# Patient Record
Sex: FEMALE | Race: WHITE | NOT HISPANIC OR LATINO | Employment: FULL TIME | URBAN - NONMETROPOLITAN AREA
[De-identification: names, ages, dates, MRNs, and addresses within clinical notes are randomized per-mention and may not be internally consistent; named-entity substitution may affect disease eponyms.]

---

## 2018-11-14 ENCOUNTER — APPOINTMENT (OUTPATIENT)
Dept: OCCUPATIONAL MEDICINE | Facility: OTHER | Age: 38
End: 2018-11-14

## 2018-11-27 ENCOUNTER — APPOINTMENT (OUTPATIENT)
Dept: OCCUPATIONAL MEDICINE | Facility: OTHER | Age: 38
End: 2018-11-27

## 2019-08-15 ENCOUNTER — VIRTUAL VISIT (OUTPATIENT)
Dept: FAMILY MEDICINE | Facility: OTHER | Age: 39
End: 2019-08-15

## 2019-08-15 NOTE — PROGRESS NOTES
"Date:   Clinician: Micah John  Clinician NPI: [id number]  Patient: Mono Melgar  Patient : 1980  Patient Address: [facility] [place] [address]  Patient Phone: [phone]  Visit Protocol: URI  Patient Summary:  Mono is a 38 year old ( : 1980 ) female who initiated a Visit for cold, sinus infection, or influenza. When asked the question \"Please sign me up to receive news, health information and promotions from [employer].\", Mono responded \"No\".    Mono states her symptoms started gradually 7-9 days ago. After her symptoms started, they improved and then got worse again.   Her symptoms consist of enlarged lymph nodes, a headache, a sore throat, malaise, and a cough.   Symptom details     Cough: Mono coughs a few times an hour and her cough is not more bothersome at night. Phlegm does not come into her throat when she coughs.     Sore throat: Mono reports having severe throat pain (7-9 on a 10 point pain scale), has exudate on her tonsils, and can swallow liquids. The lymph nodes in her neck are enlarged. A rash has not appeared on the skin since the sore throat started.     Headache: She states the headache is mild (1-3 on a 10 point pain scale).      Mono denies having facial pain or pressure, myalgias, chills, nasal congestion, ear pain, fever, rhinitis, wheezing, and teeth pain. She also denies having a sinus infection within the past year, taking antibiotic medication for the symptoms, and having recent facial or sinus surgery in the past 60 days. She is not experiencing dyspnea.   Precipitating events  Mono is not sure if she has been exposed to someone with strep throat. She has not recently been exposed to someone with influenza. Mono has not been in close contact with any high risk individuals.   Pertinent medical history  Mono does not get yeast infections when she takes antibiotics.   Weight: 185 lbs   Mono does not smoke or " use smokeless tobacco.   She denies pregnancy and denies breastfeeding. She has menstruated in the past month.   Additional information as reported by the patient (free text): I just need a zip ticket for a step test per the call back     MEDICATIONS: Excedrin Extra Strength oral, ibuprofen oral, ALLERGIES: NKDA  Clinician Response:  Dear Mono,  I am sorry you are not feeling well. Your health is our priority. Based on the information provided, a throat swab is needed to determine whether or not you have strep throat. Please be seen in a clinic or urgent care in the next 1-2 days for a rapid strep test.  You will not be charged for this Visit. Thank you for trusting us with your care.   Diagnosis: Refer for additional evaluation - strep pharyngitis  Diagnosis ICD: R69

## 2019-08-15 NOTE — PROGRESS NOTES
"Date:   Clinician: Micah John  Clinician NPI: [id number]  Patient: Mono Melgar  Patient : 1980  Patient Address: [facility] [place] [address]  Patient Phone: [phone]  Visit Protocol: URI  Patient Summary:  Mono is a 38 year old ( : 1980 ) female who initiated a Visit for cold, sinus infection, or influenza. When asked the question \"Please sign me up to receive news, health information and promotions from [employer].\", Mono responded \"No\".    Mono states her symptoms started gradually 7-9 days ago. After her symptoms started, they improved and then got worse again.   Her symptoms consist of myalgia, enlarged lymph nodes, a headache, a sore throat, malaise, and a cough.   Symptom details     Cough: Mono coughs a few times an hour and her cough is not more bothersome at night. Phlegm comes into her throat when she coughs. She does not believe the phlegm causes the cough. The color of the phlegm is yellow.     Sore throat: Mono reports having severe throat pain (7-9 on a 10 point pain scale), has exudate on her tonsils, and can swallow liquids. The lymph nodes in her neck are enlarged. A rash has not appeared on the skin since the sore throat started.     Headache: She states the headache is mild (1-3 on a 10 point pain scale).      Mono denies having facial pain or pressure, chills, nasal congestion, ear pain, fever, rhinitis, wheezing, and teeth pain. She also denies having a sinus infection within the past year, taking antibiotic medication for the symptoms, and having recent facial or sinus surgery in the past 60 days. She is not experiencing dyspnea.   Precipitating events  Mono is not sure if she has been exposed to someone with strep throat. She has not recently been exposed to someone with influenza. Mono has not been in close contact with any high risk individuals.   Pertinent medical history  Mono does not get yeast infections " when she takes antibiotics.   Weight: 185 lbs   Mono does not smoke or use smokeless tobacco.   She denies pregnancy and denies breastfeeding. She has menstruated in the past month.     MEDICATIONS: Excedrin Extra Strength oral, ibuprofen oral, ALLERGIES: NKDA  Clinician Response:  Dear Mono,  Based on the information provided, you have a viral upper respiratory infection, otherwise known as a cold. Symptoms vary from person to person, but can include sneezing, coughing, a runny nose, sore throat, and headache and range from mild to severe.  Unfortunately, there are no medications that can cure a cold, so treatment is focused on controlling symptoms as much as possible. Most people gradually feel better until symptoms are gone in 1-2 weeks.  Based on the information provided, you have viral pharyngitis. This is a sore throat caused by a virus and is usually the first sign of a cold. Your sore throat should resolve in a couple days as other cold symptoms develop.  Unfortunately, there are no medications that can cure a cold, so treatment is focused on controlling symptoms as much as possible until you recover. Most people gradually feel better in 1-2 weeks.  Medication information  Because you have a viral infection, antibiotics will not help you get better. Treating a viral infection with antibiotics could actually make you feel worse.  I am prescribing:     Benzonatate (Tessalon Perles) 100 mg oral capsule. Take 1-2 capsules by mouth 3 times per day as needed for your cough. There are no refills with this prescription.   Unless you are allergic to the over-the-counter medication(s) below, I recommend using:       Acetaminophen (Tylenol or store brand) oral tablet. Take 1-2 tablets by mouth every 4-6 hours to help with the discomfort.      Dextromethorphan (Robitussin DM or store brand). This medication is an expectorant that works by thinning the mucus in your air passages to make it easier to cough up the  mucus and clear your airways. Please follow the instructions on the package.     Over-the-counter medications do not require a prescription. Ask the pharmacist if you have any questions.  Self care  The following tips will keep you as comfortable as possible while you recover:     Rest    Drink plenty of water and other liquids    Take a hot shower to loosen congestion    Use throat lozenges    Gargle with warm salt water (1/4 teaspoon of salt per 8 ounce glass of water)    Suck on frozen items such as popsicles or ice cubes    Drink hot tea with lemon and honey    Take a spoonful of honey to reduce your cough     When to seek care  Please be seen in a clinic or urgent care if new symptoms develop, or symptoms become worse.  Call 911 or go to the emergency room if you feel that your throat is closing off, you suddenly develop a rash, you are unable to swallow fluids, you are drooling, or you are having difficulty breathing.   Diagnosis: Viral URI  Diagnosis ICD: J06.9  Additional Clinician Notes: If sore throat is causing difficulty opening mouth or swallowing or if there is no improvement in next few days come in to be seen in urgent care  Prescription: benzonatate (Tessalon Perles) 100 mg oral capsule 30 capsule, 5 days supply. Take 1-2 capsules by mouth 3 times per day as needed. Refills: 0, Refill as needed: no, Allow substitutions: yes  Addendum created: August 15 13:28:04, 2019 created by: Micah rogers: Please inform her of the situation. Thanks.  Addendum created: August 15 13:09:29, 2019 created by: Micah rogers: Ok to place order for strep swab culture

## 2019-08-23 ENCOUNTER — HOSPITAL ENCOUNTER (EMERGENCY)
Facility: HOSPITAL | Age: 39
Discharge: HOME | End: 2019-08-23
Attending: NURSE PRACTITIONER | Admitting: NURSE PRACTITIONER
Payer: COMMERCIAL

## 2019-08-23 VITALS
WEIGHT: 185 LBS | SYSTOLIC BLOOD PRESSURE: 140 MMHG | HEART RATE: 71 BPM | RESPIRATION RATE: 18 BRPM | DIASTOLIC BLOOD PRESSURE: 82 MMHG | BODY MASS INDEX: 29.03 KG/M2 | HEIGHT: 67 IN | TEMPERATURE: 98.2 F | OXYGEN SATURATION: 99 %

## 2019-08-23 DIAGNOSIS — J02.9 PHARYNGITIS: Primary | ICD-10-CM

## 2019-08-23 LAB
DEPRECATED S PYO AG THROAT QL EIA: NORMAL
SPECIMEN SOURCE: NORMAL

## 2019-08-23 PROCEDURE — G0463 HOSPITAL OUTPT CLINIC VISIT: HCPCS

## 2019-08-23 PROCEDURE — 87081 CULTURE SCREEN ONLY: CPT | Performed by: FAMILY MEDICINE

## 2019-08-23 PROCEDURE — 99203 OFFICE O/P NEW LOW 30 MIN: CPT | Mod: Z6 | Performed by: NURSE PRACTITIONER

## 2019-08-23 PROCEDURE — 87880 STREP A ASSAY W/OPTIC: CPT | Performed by: FAMILY MEDICINE

## 2019-08-23 RX ORDER — AMOXICILLIN 500 MG/1
500 CAPSULE ORAL 3 TIMES DAILY
Qty: 21 CAPSULE | Refills: 0 | Status: SHIPPED | OUTPATIENT
Start: 2019-08-23 | End: 2019-08-30

## 2019-08-23 ASSESSMENT — ENCOUNTER SYMPTOMS
SORE THROAT: 1
CHILLS: 0
ARTHRALGIAS: 0
SINUS PAIN: 0
COUGH: 0
APPETITE CHANGE: 0
HEADACHES: 1
FEVER: 0
SHORTNESS OF BREATH: 0
RHINORRHEA: 0
MYALGIAS: 0
VOICE CHANGE: 0
SINUS PRESSURE: 0

## 2019-08-23 ASSESSMENT — MIFFLIN-ST. JEOR: SCORE: 1548.84

## 2019-08-23 NOTE — ED AVS SNAPSHOT
[facility]  [address] [address]  Phone:  [phone]                                    Mono Melgar   MRN: [id number]    Department:  [facility]   Date of Visit:  8/23/2019           After Visit Summary Signature Page    I have received my discharge instructions, and my questions have been answered. I have discussed any challenges I see with this plan with the nurse or doctor.    ..........................................................................................................................................  Patient/Patient Representative Signature      ..........................................................................................................................................  Patient Representative Print Name and Relationship to Patient    ..................................................               ................................................  Date                                   Time    ..........................................................................................................................................  Reviewed by Signature/Title    ...................................................              ..............................................  Date                                               Time          22EPIC Rev 08/18

## 2019-08-23 NOTE — DISCHARGE INSTRUCTIONS
Take antibiotic as prescribed.  Drink water to keep hydrated and moisten throat.  Try throat lozenges or cough drops.  Return to emergency department for worsening or concerning symptoms.

## 2019-08-23 NOTE — ED PROVIDER NOTES
"  History     Chief Complaint   Patient presents with     Pharyngitis     2 wk hx of sore throat     HPI  Mono Melgar is a 38 year old female who presents to  for sore throat. Onset 2 weeks ago. She states she has had headaches, swollen tonsils, sore throat with exudates and low grade fever. Denies runny nose, cough or postnasal drip. Denies SOB or CP. She has tried apple cider and excedrin for headaches.     Allergies:  No Known Allergies    Problem List:    There are no active problems to display for this patient.       Past Medical History:    History reviewed. No pertinent past medical history.    Past Surgical History:    History reviewed. No pertinent surgical history.    Family History:    No family history on file.    Social History:  Marital Status:  Single [1]  Social History     Tobacco Use     Smoking status: None   Substance Use Topics     Alcohol use: None     Drug use: None        Medications:      amoxicillin (AMOXIL) 500 MG capsule         Review of Systems   Constitutional: Negative for appetite change, chills and fever.   HENT: Positive for sore throat. Negative for ear discharge, ear pain, postnasal drip, rhinorrhea, sinus pressure, sinus pain and voice change.    Respiratory: Negative for cough and shortness of breath.    Cardiovascular: Negative for chest pain.   Musculoskeletal: Negative for arthralgias and myalgias.   Neurological: Positive for headaches.   All other systems reviewed and are negative.      Physical Exam   BP: 140/82  Pulse: 71  Temp: 98.2  F (36.8  C)  Resp: 18  Height: 168.9 cm (5' 6.5\")  Weight: 83.9 kg (185 lb)(stated)  SpO2: 99 %      Physical Exam   Constitutional: She is oriented to person, place, and time. She appears well-developed and well-nourished.   HENT:   Right Ear: Tympanic membrane normal. No tenderness. No middle ear effusion.   Left Ear: Tympanic membrane normal. No tenderness.  No middle ear effusion.   Mouth/Throat: Uvula is midline. No tonsillar " abscesses. Tonsillar exudate.   Neck: Normal range of motion.   Cardiovascular: Normal rate, regular rhythm and normal heart sounds.   Pulmonary/Chest: Effort normal and breath sounds normal. No respiratory distress. She has no wheezes.   Neurological: She is alert and oriented to person, place, and time.   Skin: Skin is warm. Capillary refill takes less than 2 seconds.   Psychiatric: She has a normal mood and affect.   Nursing note and vitals reviewed.      ED Course        Procedures               Results for orders placed or performed during the hospital encounter of 08/23/19 (from the past 24 hour(s))   Rapid strep screen   Result Value Ref Range    Specimen Description Throat     Rapid Strep A Screen       NEGATIVE: No Group A streptococcal antigen detected by immunoassay, await culture report.       Medications - No data to display    Assessments & Plan (with Medical Decision Making)   1) Pharyngitis    Discussed findings with patient. Rapid strep negative, culture pending. Patient has had sore throat x 2 weeks. Erythema and left tonsillar exudates present. Patient is afebrile. Discussed viral vs bacterial etiology. Given that symptoms have persisted x 2 weeks, will treat with antibiotic. Advised patient to try throat lozenges and drink water to keep throat moistened. If no improvement in symptoms she will follow up with her PCP. Return to emergency department for worsening or concerning symptoms. Patient verbalized understanding.     I have reviewed the nursing notes.    I have reviewed the findings, diagnosis, plan and need for follow up with the patient.    Discharge Medication List as of 8/23/2019  5:44 PM      START taking these medications    Details   amoxicillin (AMOXIL) 500 MG capsule Take 1 capsule (500 mg) by mouth 3 times daily for 7 days, Disp-21 capsule, R-0, E-Prescribe             Final diagnoses:   Pharyngitis       8/23/2019   [facility]     Mpofu, Prudence, CNP  08/23/19  [id number]

## 2019-08-25 LAB
BACTERIA SPEC CULT: NORMAL
SPECIMEN SOURCE: NORMAL

## 2019-11-19 ENCOUNTER — APPOINTMENT (OUTPATIENT)
Dept: OCCUPATIONAL MEDICINE | Facility: OTHER | Age: 39
End: 2019-11-19

## 2020-04-04 ENCOUNTER — VIRTUAL VISIT (OUTPATIENT)
Dept: FAMILY MEDICINE | Facility: OTHER | Age: 40
End: 2020-04-04

## 2020-04-04 DIAGNOSIS — R21 RASH: Primary | ICD-10-CM

## 2020-04-04 RX ORDER — MUPIROCIN 20 MG/G
OINTMENT TOPICAL 3 TIMES DAILY
Qty: 30 G | Refills: 1 | Status: SHIPPED | OUTPATIENT
Start: 2020-04-04 | End: 2021-01-11

## 2020-04-04 NOTE — PROGRESS NOTES
"Date: 2020 09:14:22  Clinician: Newton Mendoza  Clinician NPI: [id number]  Patient: Mono Melgar  Patient : 1980  Patient Address: [facility] [place] [address]  Patient Phone: [phone]  Visit Protocol: General skin conditions  Patient Summary:  Mono is a 39 year old ( : 1980 ) female who initiated a Visit for evaluation of an unspecified skin condition. When asked the question \"Please sign me up to receive news, health information and promotions from [employer].\", Mono responded \"No\".    Images of her skin condition were uploaded.  Her symptoms started today and affect the right side of her body. The skin condition is located on her face. The skin condition is red in color.   The affected area has sores. It feels itchy and numb. The symptoms do not interfere with her sleep.   Symptom details   Redness: The redness has not rapidly increased in size.   The skin condition has not changed since the symptoms started.   Denied symptoms include hives, warm to touch, drainage, crusts, blisters, burning, tender to touch, scabs, pimples, dry/flaky skin, and pain. Mono does not feel feverish. She does not have a rash in the shape of a bull's-eye.   Mono has not tried anything to relieve her symptoms.   Precipitating events   Mono did not come in contact with any irritants prior to the onset of her symptoms and has not been in close contact with anyone that has similar symptoms. She also did not spend time in a wooded area, swim, travel, or spend excess time in the sun just before her symptoms started. Mono did not get bitten or stung by an insect.   Pertinent medical history  Mono has experienced this skin condition before. Her current skin condition does not come and go. The last time she experienced this skin condition was more than a year ago.   Mono has had chickenpox and has had shingles in the past.    Mono does not have a history or a family history of " atopia. Ongoing medical conditions were denied.   Mono does not need a return to work/school note.   Weight: 190 lbs   Mono does not smoke or use smokeless tobacco.   She denies pregnancy and denies breastfeeding. She has menstruated in the past month.   Additional information as reported by the patient (free text): I've been seen 2 times for this and they were unsure if it was staph or shingles,  however I can't take meds for both at the same time as I developed a rash and yeast infection.   Weight: 190 lbs    MEDICATIONS: Tylenol oral, Excedrin Extra Strength oral, ibuprofen oral, ALLERGIES: NKDA  Clinician Response:  Dear Mono,   Your health is our priority. To determine the most appropriate care for you, I would like you to be seen in person to further discuss your health history and symptoms.  You will not be charged for this Visit. Thank you for trusting us with your care.   Diagnosis: Refer for additional evaluation  Diagnosis ICD: R69

## 2020-07-02 ENCOUNTER — IMMUNIZATION (OUTPATIENT)
Dept: FAMILY MEDICINE | Facility: OTHER | Age: 40
End: 2020-07-02

## 2020-07-02 DIAGNOSIS — R05.9 COUGH: Primary | ICD-10-CM

## 2020-07-02 PROCEDURE — U0003 INFECTIOUS AGENT DETECTION BY NUCLEIC ACID (DNA OR RNA); SEVERE ACUTE RESPIRATORY SYNDROME CORONAVIRUS 2 (SARS-COV-2) (CORONAVIRUS DISEASE [COVID-19]), AMPLIFIED PROBE TECHNIQUE, MAKING USE OF HIGH THROUGHPUT TECHNOLOGIES AS DESCRIBED BY CMS-2020-01-R: HCPCS | Performed by: FAMILY MEDICINE

## 2020-07-03 LAB
SARS-COV-2 RNA SPEC QL NAA+PROBE: NOT DETECTED
SPECIMEN SOURCE: NORMAL

## 2020-10-02 ENCOUNTER — APPOINTMENT (OUTPATIENT)
Dept: OCCUPATIONAL MEDICINE | Facility: OTHER | Age: 40
End: 2020-10-02

## 2020-10-08 ENCOUNTER — TRANSFERRED RECORDS (OUTPATIENT)
Dept: HEALTH INFORMATION MANAGEMENT | Facility: HOSPITAL | Age: 40
End: 2020-10-08

## 2020-10-08 LAB
HPV ABSTRACT: NORMAL
PAP-ABSTRACT: NORMAL

## 2021-01-11 ENCOUNTER — HOSPITAL ENCOUNTER (EMERGENCY)
Facility: HOSPITAL | Age: 41
Discharge: HOME | End: 2021-01-11
Attending: PHYSICIAN ASSISTANT | Admitting: PHYSICIAN ASSISTANT
Payer: COMMERCIAL

## 2021-01-11 ENCOUNTER — APPOINTMENT (OUTPATIENT)
Dept: ULTRASOUND IMAGING | Facility: HOSPITAL | Age: 41
End: 2021-01-11
Attending: PHYSICIAN ASSISTANT
Payer: COMMERCIAL

## 2021-01-11 VITALS
HEART RATE: 81 BPM | RESPIRATION RATE: 16 BRPM | OXYGEN SATURATION: 99 % | TEMPERATURE: 98.6 F | DIASTOLIC BLOOD PRESSURE: 83 MMHG | SYSTOLIC BLOOD PRESSURE: 145 MMHG

## 2021-01-11 DIAGNOSIS — M71.21 SYNOVIAL CYST OF POPLITEAL SPACE (BAKER), RIGHT KNEE: ICD-10-CM

## 2021-01-11 PROCEDURE — 93971 EXTREMITY STUDY: CPT | Mod: RT

## 2021-01-11 PROCEDURE — G0463 HOSPITAL OUTPT CLINIC VISIT: HCPCS | Mod: 25

## 2021-01-11 PROCEDURE — 99213 OFFICE O/P EST LOW 20 MIN: CPT | Performed by: PHYSICIAN ASSISTANT

## 2021-01-11 PROCEDURE — G0463 HOSPITAL OUTPT CLINIC VISIT: HCPCS

## 2021-01-11 ASSESSMENT — ENCOUNTER SYMPTOMS
SHORTNESS OF BREATH: 0
VOMITING: 0
PALPITATIONS: 0
DIZZINESS: 0
FEVER: 0
CONFUSION: 0
NECK STIFFNESS: 0
FATIGUE: 0
LIGHT-HEADEDNESS: 0
CHILLS: 0
COUGH: 0
CHEST TIGHTNESS: 0
HEADACHES: 0

## 2021-01-11 NOTE — ED AVS SNAPSHOT
[facility]  [address] [place] [address]  Phone: [phone]                                    Mono Melgar   MRN: [id number]    Department: [facility]   Date of Visit: 1/11/2021           After Visit Summary Signature Page    I have received my discharge instructions, and my questions have been answered. I have discussed any challenges I see with this plan with the nurse or doctor.    ..........................................................................................................................................  Patient/Patient Representative Signature      ..........................................................................................................................................  Patient Representative Print Name and Relationship to Patient    ..................................................               ................................................  Date                                   Time    ..........................................................................................................................................  Reviewed by Signature/Title    ...................................................              ..............................................  Date                                               Time          22EPIC Rev 08/18

## 2021-01-12 NOTE — DISCHARGE INSTRUCTIONS
Thank you for allowing the urgent care to participate in your care. Please refer to your AVS for follow up and pain/symptoms management recommendations (I.e.: medications, helpful conservative treatment modalities, appropriate follow up if need to a specialist or family practice, etc.). Please return to urgent care if your symptoms change or worsen.     Think of a Baker's cyst like a water alone inside her knee, it can wax and wane in size    Discharge instructions:  -If you were prescribed a medication(s), please take this as prescribed/directed  -Monitor your symptoms, if changing/worsening, return to UC/ER or PCP for follow up    For pain control - we recommend alternating Tylenol and Ibuprofen if you are able to take these medications. Alternate every 4 hours as needed. I.e.: Ibuprofen at 8am, Tylenol 12pm, Ibuprofen 4pm    -Daily maximum of Tylenol is 4000mg (recommend staying under 3000mg)   -Daily maximum of Ibuprofen is 1200mg (take no more than six 200mg pills a day)    Compression with an Ace wrap, elevation, heat and/or ice whichever feels better.    If Garcia's cyst persist beyond a couple weeks you can contemplate intra-articular steroid injection into your knee

## 2021-01-12 NOTE — ED TRIAGE NOTES
Pt presents today with c/o lower leg swelling and pain. Started over the weekend. Today while she was working pain and swelling became worse.

## 2021-01-12 NOTE — ED PROVIDER NOTES
History     Chief Complaint   Patient presents with     Leg Pain     rt lower leg pain, denies injury     HPI  Mono Melgar is a 40 year old female who presents to urgent care ambulatory for right lower leg swelling which began yesterday.  No known mechanism of injury.  Patient notes swelling is from the popliteal fossa into the proximal gastroc/soleus region.  Patient wore compression socks to work and she noticed that later in the day she had increasing pain and swelling, upon rolling down the compression sock there is visible size difference between the right and left calf regions.  Patient is a non-smoker, she not on any forms of birth control (tubal) and overall has no other risk factors for DVT.  No history of DVT as well.  Patient is not on any blood thinners.     Denies chest pain, shortness of breath, nausea, vomiting and/or diarrhea.  No other pertinent symptoms.    No PCP on file    Allergies:  No Known Allergies    Problem List:    There are no active problems to display for this patient.       Past Medical History:    History reviewed. No pertinent past medical history.    Past Surgical History:    History reviewed. No pertinent surgical history.    Family History:    History reviewed. No pertinent family history.    Social History:  Marital Status:  Single [1]  Social History     Tobacco Use     Smoking status: None   Substance Use Topics     Alcohol use: None     Drug use: None      Medications:    No current outpatient medications on file.    Review of Systems   Constitutional: Negative for chills, fatigue and fever.   Respiratory: Negative for cough, chest tightness and shortness of breath.    Cardiovascular: Positive for leg swelling (right). Negative for chest pain and palpitations.   Gastrointestinal: Negative for vomiting.   Musculoskeletal: Negative for neck stiffness.   Skin: Negative for pallor.   Neurological: Negative for dizziness, light-headedness and headaches.    Psychiatric/Behavioral: Negative for confusion.   All other systems reviewed and are negative.    Physical Exam   BP: 145/83  Pulse: 81  Temp: 98.6  F (37  C)  Resp: 16  SpO2: 99 %    Physical Exam  Vitals signs and nursing note reviewed.   HENT:      Head: Normocephalic and atraumatic.   Eyes:      Conjunctiva/sclera: Conjunctivae normal.   Neck:      Musculoskeletal: Normal range of motion.   Cardiovascular:      Rate and Rhythm: Normal rate and regular rhythm.      Heart sounds: Normal heart sounds.   Pulmonary:      Effort: Pulmonary effort is normal.      Breath sounds: Normal breath sounds.   Musculoskeletal:      Right lower leg: She exhibits tenderness and swelling.      Left lower leg: She exhibits no tenderness, no bony tenderness and no swelling. No edema.        Legs:       Comments: Upon rolling down patient's compression socks there is visible 1.5 times swelling to the right gastroc/calf region with associated tenderness.  Patient has full range of motion of the knee, ankle and is able to weight-bear without difficulty.   Skin:     General: Skin is warm and dry.      Capillary Refill: Capillary refill takes less than 2 seconds.   Neurological:      General: No focal deficit present.      Mental Status: She is alert and oriented to person, place, and time.   Psychiatric:         Mood and Affect: Mood normal.         Behavior: Behavior normal.         Thought Content: Thought content normal.         Judgment: Judgment normal.       ED Course   1640: patient to U/S for RLE duplex       Procedures               Results for orders placed or performed during the hospital encounter of 01/11/21 (from the past 24 hour(s))   US Lower Extremity Venous Duplex Right    Narrative    Exam:US LOWER EXTREMITY VENOUS DUPLEX RIGHT    History: Right leg swelling    Comparisons: None    Technique: Venous duplex ultrasonography of the right lower extremity  was performed.     Findings: The common femoral vein, superficial  femoral vein and  popliteal vein are fully compressible with spontaneous and augmentable  venous flow. A Baker's cyst is seen.           Impression    Impression: No evidence of deep venous thrombosis within the right  lower extremity.    NELLY WOODRUFF MD       Medications - No data to display    Assessments & Plan (with Medical Decision Making)     I have reviewed the nursing notes.    I have reviewed the findings, diagnosis, plan and need for follow up with the patient.  New Prescriptions    No medications on file       Final diagnoses:   Synovial cyst of popliteal space (Baker), right knee   (M71.21) Synovial cyst of popliteal space (Baker), right knee  Comment: Acute flareup  Plan: Patient is a pleasant, alert 40-year-old female no obvious distress.  She presents urgent care today with a chief complaint of right lower leg swelling and pain which initially began by the knee and progressed into her calf.  Patient is able to ambulate without difficulty.  Vital signs stable.  Ultrasound was ordered and is negative for DVT, there is a small Baker's cyst noted.  I educated the patient on the anatomy, pathophysiology and typical treatment plan of Baker's cyst.  She should utilize an Ace wrap for compression, alternate anti-inflammatories Tylenol and ibuprofen every 4-6 hours as needed.  She may also utilize heat and ice as needed.  If her Baker's cyst continues to bother her she may be a candidate for intra-articular corticosteroid injection.  Baker's cyst directed AVS was provided to patient.    All questions and concerns answered to patient satisfaction.  She is stable for discharge.  She will follow-up with urgent care in as-needed basis.  All questions and concerns were answered to her satisfaction.    Latoya Turner PA-C  1/11/2021   [facility]    This document was prepared using a combination of typing and voice generated software.  While every attempt was made for accuracy, spelling and  grammatical errors may exist.

## 2021-11-09 NOTE — RESULT ENCOUNTER NOTE
Coronavirus (COVID-19) Notification    Your result for COVID-19 is Negative  Letter sent that will serve as a formal notice for your employer   Attending with

## 2021-11-30 ENCOUNTER — APPOINTMENT (OUTPATIENT)
Dept: OCCUPATIONAL MEDICINE | Facility: OTHER | Age: 41
End: 2021-11-30

## 2023-05-05 ENCOUNTER — APPOINTMENT (OUTPATIENT)
Dept: OCCUPATIONAL MEDICINE | Facility: OTHER | Age: 43
End: 2023-05-05

## 2024-01-31 ENCOUNTER — APPOINTMENT (OUTPATIENT)
Dept: OCCUPATIONAL MEDICINE | Facility: OTHER | Age: 44
End: 2024-01-31

## 2024-06-26 DIAGNOSIS — Z12.31 ENCOUNTER FOR SCREENING MAMMOGRAM FOR BREAST CANCER: Primary | ICD-10-CM

## 2024-07-01 ENCOUNTER — ANCILLARY PROCEDURE (OUTPATIENT)
Dept: MAMMOGRAPHY | Facility: OTHER | Age: 44
End: 2024-07-01
Attending: NURSE PRACTITIONER
Payer: COMMERCIAL

## 2024-07-01 DIAGNOSIS — Z12.31 ENCOUNTER FOR SCREENING MAMMOGRAM FOR BREAST CANCER: ICD-10-CM

## 2024-07-01 PROCEDURE — 77067 SCR MAMMO BI INCL CAD: CPT | Mod: TC | Performed by: RADIOLOGY

## 2024-07-01 PROCEDURE — 77063 BREAST TOMOSYNTHESIS BI: CPT | Mod: TC | Performed by: RADIOLOGY

## 2024-07-17 ENCOUNTER — OFFICE VISIT (OUTPATIENT)
Dept: FAMILY MEDICINE | Facility: OTHER | Age: 44
End: 2024-07-17
Attending: NURSE PRACTITIONER
Payer: COMMERCIAL

## 2024-07-17 VITALS
HEART RATE: 75 BPM | TEMPERATURE: 97.2 F | DIASTOLIC BLOOD PRESSURE: 80 MMHG | BODY MASS INDEX: 31.04 KG/M2 | SYSTOLIC BLOOD PRESSURE: 120 MMHG | OXYGEN SATURATION: 98 % | HEIGHT: 67 IN | WEIGHT: 197.8 LBS

## 2024-07-17 DIAGNOSIS — Z76.89 ENCOUNTER TO ESTABLISH CARE: Primary | ICD-10-CM

## 2024-07-17 DIAGNOSIS — Z13.220 LIPID SCREENING: ICD-10-CM

## 2024-07-17 DIAGNOSIS — E78.2 MIXED HYPERLIPIDEMIA: ICD-10-CM

## 2024-07-17 DIAGNOSIS — Z86.19 HISTORY OF COLD SORES: ICD-10-CM

## 2024-07-17 LAB
ALBUMIN SERPL BCG-MCNC: 4.2 G/DL (ref 3.5–5.2)
ALP SERPL-CCNC: 53 U/L (ref 40–150)
ALT SERPL W P-5'-P-CCNC: 16 U/L (ref 0–50)
ANION GAP SERPL CALCULATED.3IONS-SCNC: 8 MMOL/L (ref 7–15)
AST SERPL W P-5'-P-CCNC: 19 U/L (ref 0–45)
BILIRUB SERPL-MCNC: 0.5 MG/DL
BUN SERPL-MCNC: 18.1 MG/DL (ref 6–20)
CALCIUM SERPL-MCNC: 8.9 MG/DL (ref 8.8–10.4)
CHLORIDE SERPL-SCNC: 106 MMOL/L (ref 98–107)
CHOLEST SERPL-MCNC: 251 MG/DL
CREAT SERPL-MCNC: 0.77 MG/DL (ref 0.51–0.95)
EGFRCR SERPLBLD CKD-EPI 2021: >90 ML/MIN/1.73M2
FASTING STATUS PATIENT QL REPORTED: YES
FASTING STATUS PATIENT QL REPORTED: YES
GLUCOSE SERPL-MCNC: 94 MG/DL (ref 70–99)
HCO3 SERPL-SCNC: 26 MMOL/L (ref 22–29)
HDLC SERPL-MCNC: 49 MG/DL
LDLC SERPL CALC-MCNC: 183 MG/DL
NONHDLC SERPL-MCNC: 202 MG/DL
POTASSIUM SERPL-SCNC: 4.3 MMOL/L (ref 3.4–5.3)
PROT SERPL-MCNC: 7.4 G/DL (ref 6.4–8.3)
SODIUM SERPL-SCNC: 140 MMOL/L (ref 135–145)
TRIGL SERPL-MCNC: 97 MG/DL

## 2024-07-17 PROCEDURE — 99203 OFFICE O/P NEW LOW 30 MIN: CPT | Performed by: NURSE PRACTITIONER

## 2024-07-17 PROCEDURE — 36415 COLL VENOUS BLD VENIPUNCTURE: CPT | Performed by: NURSE PRACTITIONER

## 2024-07-17 PROCEDURE — 80061 LIPID PANEL: CPT | Performed by: NURSE PRACTITIONER

## 2024-07-17 PROCEDURE — 80053 COMPREHEN METABOLIC PANEL: CPT | Performed by: NURSE PRACTITIONER

## 2024-07-17 RX ORDER — ATORVASTATIN CALCIUM 20 MG/1
20 TABLET, FILM COATED ORAL DAILY
Qty: 90 TABLET | Refills: 3 | Status: SHIPPED | OUTPATIENT
Start: 2024-07-17

## 2024-07-17 RX ORDER — VALACYCLOVIR HYDROCHLORIDE 1 G/1
2000 TABLET, FILM COATED ORAL 2 TIMES DAILY
Qty: 12 TABLET | Refills: 1 | Status: SHIPPED | OUTPATIENT
Start: 2024-07-17 | End: 2024-07-18

## 2024-07-17 RX ORDER — ONDANSETRON 4 MG/1
TABLET, FILM COATED ORAL
COMMUNITY
Start: 2023-04-01

## 2024-07-17 RX ORDER — AMOXICILLIN 500 MG
CAPSULE ORAL
COMMUNITY
Start: 2024-02-03

## 2024-07-17 ASSESSMENT — PAIN SCALES - GENERAL: PAINLEVEL: NO PAIN (0)

## 2024-07-17 NOTE — PATIENT INSTRUCTIONS
LIFESTYLE CHANGES  Mediterranean style eating/plant based diet     Increase fiber intake around 30 grams daily   Protein goal (weight/2.2)  X  0.8-1.2     Eat a Healthy diet  Eat more vegetables/plants in your diet (1/2 your food should be vegetables)  Eat health fats  Verden oil  avocados  Eat healthy proteins  Poultry without the skin  Fish  Limit red meat  Nuts - limit to 1/4 cup per day   Increase physical activity  Slowly work up to 30 minutes of physical activity most days of the week  Aerobic activity 150 minute a week  2 days of resistance exercising   Move every 30-60 minutes         Try daily yoga and meditation and relaxation breathing

## 2024-07-17 NOTE — PROGRESS NOTES
"  Assessment & Plan     Encounter to establish care  Ok to establish care  Encouraged yearly physicals   - Comprehensive metabolic panel (BMP + Alb, Alk Phos, ALT, AST, Total. Bili, TP); Future  - Comprehensive metabolic panel (BMP + Alb, Alk Phos, ALT, AST, Total. Bili, TP)    Lipid screening  Cholesterol is elevated  LDL is High  HDL is slightly low  Consider statin low dose vs increasing fiber - plant based diet   - Lipid Profile (Chol, Trig, HDL, LDL calc); Future  - Lipid Profile (Chol, Trig, HDL, LDL calc)  The 10-year ASCVD risk score (Bladimir HALE, et al., 2019) is: 1.1%    Values used to calculate the score:      Age: 43 years      Sex: Female      Is Non- : No      Diabetic: No      Tobacco smoker: No      Systolic Blood Pressure: 120 mmHg      Is BP treated: No      HDL Cholesterol: 49 mg/dL      Total Cholesterol: 251 mg/dL      History of cold sores  Refilled   No current symptoms, usually she has them in the winter   - valACYclovir (VALTREX) 1000 mg tablet; Take 2 tablets (2,000 mg) by mouth 2 times daily for 1 day    Mixed hyperlipidemia  Start Lipitor 20 mg daily   Recheck fasting lipids in 4-8 weeks.  Discussed medication and side effects along with providing written information in AVS      BMI  Estimated body mass index is 31.35 kg/m  as calculated from the following:    Height as of this encounter: 1.692 m (5' 6.6\").    Weight as of this encounter: 89.7 kg (197 lb 12.8 oz).   Weight management plan: Discussed healthy diet and exercise guidelines      See Patient Instructions    Return in about 1 year (around 7/17/2025) for Physical Exam.    Ela Villareal is a 43 year old, presenting for the following health issues:  Establish Care        7/17/2024     9:16 AM   Additional Questions   Roomed by bobby saleem   Accompanied by self         7/17/2024     9:16 AM   Patient Reported Additional Medications   Patient reports taking the following new medications none     History of " "Present Illness       Reason for visit:  Primary care, labs, check up    She eats 2-3 servings of fruits and vegetables daily.She consumes 0 sweetened beverage(s) daily.She exercises with enough effort to increase her heart rate 20 to 29 minutes per day.  She exercises with enough effort to increase her heart rate 3 or less days per week. She is missing 1 dose(s) of medications per week.  She is not taking prescribed medications regularly due to remembering to take.       Establish care    PAP: 2020 normal pap and negative HPV - due in 2025  Mammogram: normal 7/2024   Colon screening to start around age 45 or as needed if family history of concerns - no concerns at this time     History of cold sores  Request treatment on hand       Review of Systems  CONSTITUTIONAL: NEGATIVE for fever, chills, change in weight  INTEGUMENTARY/SKIN: NEGATIVE for worrisome rashes, moles or lesions  EYES: NEGATIVE for vision changes or irritation  ENT/MOUTH: NEGATIVE for ear, mouth and throat problems  RESP: NEGATIVE for significant cough or SOB  CV: NEGATIVE for chest pain, palpitations or peripheral edema  GI: NEGATIVE for nausea, abdominal pain, heartburn, or change in bowel habits  : normal menstrual cycles and denies dysuria   MUSCULOSKELETAL: NEGATIVE for significant arthralgias or myalgia  NEURO: NEGATIVE for weakness, dizziness or paresthesias  ENDOCRINE: NEGATIVE for temperature intolerance, skin/hair changes  PSYCHIATRIC: NEGATIVE for changes in mood or affect      Objective    /80 (BP Location: Left arm, Patient Position: Sitting, Cuff Size: Adult Large)   Pulse 75   Temp 97.2  F (36.2  C) (Tympanic)   Ht 1.692 m (5' 6.6\")   Wt 89.7 kg (197 lb 12.8 oz)   LMP 07/10/2024   SpO2 98%   BMI 31.35 kg/m    Body mass index is 31.35 kg/m .  Physical Exam   GENERAL: alert and no distress  EYES: Eyes grossly normal to inspection, PERRL and conjunctivae and sclerae normal  HENT: ear canals and TM's normal, nose and mouth " without ulcers or lesions  NECK: no adenopathy, no asymmetry, masses, or scars  RESP: lungs clear to auscultation - no rales, rhonchi or wheezes  CV: regular rate and rhythm, normal S1 S2, no S3 or S4, no murmur, click or rub, no peripheral edema  ABDOMEN: soft, nontender, no hepatosplenomegaly, no masses and bowel sounds normal  MS: no gross musculoskeletal defects noted, no edema  SKIN: no suspicious lesions or rashes  NEURO: Normal strength and tone, mentation intact and speech normal  PSYCH: mentation appears normal, affect normal/bright  LYMPH: no cervical, supraclavicular, axillary, or inguinal adenopathy    Results for orders placed or performed in visit on 07/17/24   Lipid Profile (Chol, Trig, HDL, LDL calc)     Status: Abnormal   Result Value Ref Range    Cholesterol 251 (H) <200 mg/dL    Triglycerides 97 <150 mg/dL    Direct Measure HDL 49 (L) >=50 mg/dL    LDL Cholesterol Calculated 183 (H) <=100 mg/dL    Non HDL Cholesterol 202 (H) <130 mg/dL    Patient Fasting > 8hrs? Yes     Narrative    Cholesterol  Desirable:  <200 mg/dL    Triglycerides  Normal:  Less than 150 mg/dL  Borderline High:  150-199 mg/dL  High:  200-499 mg/dL  Very High:  Greater than or equal to 500 mg/dL    Direct Measure HDL  Female:  Greater than or equal to 50 mg/dL   Male:  Greater than or equal to 40 mg/dL    LDL Cholesterol  Desirable:  <100mg/dL  Above Desirable:  100-129 mg/dL   Borderline High:  130-159 mg/dL   High:  160-189 mg/dL   Very High:  >= 190 mg/dL    Non HDL Cholesterol  Desirable:  130 mg/dL  Above Desirable:  130-159 mg/dL  Borderline High:  160-189 mg/dL  High:  190-219 mg/dL  Very High:  Greater than or equal to 220 mg/dL   Comprehensive metabolic panel (BMP + Alb, Alk Phos, ALT, AST, Total. Bili, TP)     Status: None   Result Value Ref Range    Sodium 140 135 - 145 mmol/L    Potassium 4.3 3.4 - 5.3 mmol/L    Carbon Dioxide (CO2) 26 22 - 29 mmol/L    Anion Gap 8 7 - 15 mmol/L    Urea Nitrogen 18.1 6.0 - 20.0  mg/dL    Creatinine 0.77 0.51 - 0.95 mg/dL    GFR Estimate >90 >60 mL/min/1.73m2    Calcium 8.9 8.8 - 10.4 mg/dL    Chloride 106 98 - 107 mmol/L    Glucose 94 70 - 99 mg/dL    Alkaline Phosphatase 53 40 - 150 U/L    AST 19 0 - 45 U/L    ALT 16 0 - 50 U/L    Protein Total 7.4 6.4 - 8.3 g/dL    Albumin 4.2 3.5 - 5.2 g/dL    Bilirubin Total 0.5 <=1.2 mg/dL    Patient Fasting > 8hrs? Yes            Signed Electronically by: JAMEEL Son CNP

## 2024-09-22 ENCOUNTER — HEALTH MAINTENANCE LETTER (OUTPATIENT)
Age: 44
End: 2024-09-22

## 2024-10-15 ENCOUNTER — LAB (OUTPATIENT)
Dept: LAB | Facility: HOSPITAL | Age: 44
End: 2024-10-15
Payer: COMMERCIAL

## 2024-10-15 DIAGNOSIS — E78.2 MIXED HYPERLIPIDEMIA: ICD-10-CM

## 2024-10-15 LAB
CHOLEST SERPL-MCNC: 177 MG/DL
FASTING STATUS PATIENT QL REPORTED: NO
HDLC SERPL-MCNC: 51 MG/DL
LDLC SERPL CALC-MCNC: 102 MG/DL
NONHDLC SERPL-MCNC: 126 MG/DL
TRIGL SERPL-MCNC: 120 MG/DL

## 2024-10-15 PROCEDURE — 36415 COLL VENOUS BLD VENIPUNCTURE: CPT

## 2024-10-15 PROCEDURE — 80061 LIPID PANEL: CPT

## 2025-07-21 SDOH — HEALTH STABILITY: PHYSICAL HEALTH: ON AVERAGE, HOW MANY MINUTES DO YOU ENGAGE IN EXERCISE AT THIS LEVEL?: 20 MIN

## 2025-07-21 SDOH — HEALTH STABILITY: PHYSICAL HEALTH: ON AVERAGE, HOW MANY DAYS PER WEEK DO YOU ENGAGE IN MODERATE TO STRENUOUS EXERCISE (LIKE A BRISK WALK)?: 2 DAYS

## 2025-07-21 ASSESSMENT — SOCIAL DETERMINANTS OF HEALTH (SDOH): HOW OFTEN DO YOU GET TOGETHER WITH FRIENDS OR RELATIVES?: ONCE A WEEK

## 2025-07-21 NOTE — PROGRESS NOTES
"Preventive Care Visit  [facility]  JAMEEL Son CNP, Family Medicine  Jul 22, 2025      Assessment & Plan     Routine general medical examination at a health care facility  Continue yearly physicals   - Lipid Profile (Chol, Trig, HDL, LDL calc); Future  - Comprehensive metabolic panel (BMP + Alb, Alk Phos, ALT, AST, Total. Bili, TP); Future  - CBC with platelets and differential; Future    Mixed hyperlipidemia  LDL slightly elevated, but has improved significantly from prior to statin  Continue with current statin therapy   Refilled for the year   - Lipid Profile (Chol, Trig, HDL, LDL calc); Future  The 10-year ASCVD risk score (Bladimir HALE, et al., 2019) is: 0.6%    Values used to calculate the score:      Age: 44 years      Sex: Female      Is Non- : No      Diabetic: No      Tobacco smoker: No      Systolic Blood Pressure: 118 mmHg      Is BP treated: No      HDL Cholesterol: 47 mg/dL      Total Cholesterol: 170 mg/dL      Special screening examination for human papillomavirus (HPV)  - HPV and Gynecologic Cytology Panel - Recommended Age 30 - 65 Years    History of cold sores  Refilled to have available as needed for cold sore breakouts   - valACYclovir (VALTREX) 1000 mg tablet; Take 2 tablets (2,000 mg) by mouth 2 times daily.    Menstrual migraine without status migrainosus, not intractable  Refilled to have as needed for nausea with migraines   - ondansetron (ZOFRAN) 4 MG tablet; Take 1 tablet (4 mg) by mouth every 6 hours as needed for nausea.    Encounter for screening mammogram for breast cancer  - MA Screen Bilateral w/Rickey; Future      Patient has been advised of split billing requirements and indicates understanding: Yes    BMI  Estimated body mass index is 31.4 kg/m  as calculated from the following:    Height as of this encounter: 1.692 m (5' 6.6\").    Weight as of this encounter: 89.9 kg (198 lb 1.6 oz).   Weight management plan: Discussed healthy diet and " exercise guidelines    Counseling  Appropriate preventive services were addressed with this patient via screening, questionnaire, or discussion as appropriate for fall prevention, nutrition, physical activity, Tobacco-use cessation, social engagement, weight loss and cognition.  Checklist reviewing preventive services available has been given to the patient.  Reviewed patient's diet, addressing concerns and/or questions.   She is at risk for lack of exercise and has been provided with information to increase physical activity for the benefit of her well-being.   Reviewed preventive health counseling, as reflected in patient instructions    Follow-up   Return in about 53 weeks (around 7/28/2026) for Annual Wellness Visit.        Ela Villareal is a 44 year old, presenting for the following:  Physical and Lipids           HPI     Hyperlipidemia Follow-Up    Are you regularly taking any medication or supplement to lower your cholesterol?   Yes- Lipitor 20 mg  Are you having muscle aches or other side effects that you think could be caused by your cholesterol lowering medication?  Yes- has been getting a lot of cramping in her feet     Advance Care Planning    Discussed advance care planning with patient; however, patient declined at this time.        7/21/2025   General Health   How would you rate your overall physical health? (!) FAIR   Feel stress (tense, anxious, or unable to sleep) Only a little   (!) STRESS CONCERN      7/21/2025   Nutrition   Three or more servings of calcium each day? (!) NO   Diet: Regular (no restrictions)   How many servings of fruit and vegetables per day? (!) 2-3   How many sweetened beverages each day? 0-1         7/21/2025   Exercise   Days per week of moderate/strenous exercise 2 days   Average minutes spent exercising at this level 20 min   (!) EXERCISE CONCERN      7/21/2025   Social Factors   Frequency of gathering with friends or relatives Once a week   Worry food won't last  until get money to buy more No   Food not last or not have enough money for food? No   Do you have housing? (Housing is defined as stable permanent housing and does not include staying outside in a car, in a tent, in an abandoned building, in an overnight shelter, or couch-surfing.) Yes   Are you worried about losing your housing? No   Lack of transportation? No   Unable to get utilities (heat,electricity)? No         7/21/2025   Dental   Dentist two times every year? Yes         Today's PHQ-2 Score:       7/21/2025    10:07 AM   PHQ-2 ( 1999 Pf[employer])   Q1: Little interest or pleasure in doing things 0   Q2: Feeling down, depressed or hopeless 0   PHQ-2 Score 0    Q1: Little interest or pleasure in doing things Not at all   Q2: Feeling down, depressed or hopeless Not at all   PHQ-2 Score 0       Patient-reported           7/21/2025   Substance Use   Alcohol more than 3/day or more than 7/wk No   Do you use any other substances recreationally? No     Social History     Tobacco Use    Smoking status: Never    Smokeless tobacco: Never   Substance Use Topics    Alcohol use: Not Currently     Comment: Very rare 2x year    Drug use: Never           7/1/2024   LAST FHS-7 RESULTS   1st degree relative breast or ovarian cancer No   Any relative bilateral breast cancer No   Any male have breast cancer No   Any ONE woman have BOTH breast AND ovarian cancer No   Any woman with breast cancer before 50yrs No   2 or more relatives with breast AND/OR ovarian cancer No   2 or more relatives with breast AND/OR bowel cancer No        Mammogram Screening - Mammogram every 1-2 years updated in Health Maintenance based on mutual decision making          7/21/2025   One time HIV Screening   Previous HIV test? No         7/21/2025   STI Screening   New sexual partner(s) since last STI/HIV test? No     History of abnormal Pap smear:         10/8/2020    11:12 AM   PAP / HPV   PAP-ABSTRACT See Scanned Document      ASCVD Risk   The  10-year ASCVD risk score (Bladimir HALE, et al., 2019) is: 0.5%    Values used to calculate the score:      Age: 44 years      Sex: Female      Is Non- : No      Diabetic: No      Tobacco smoker: No      Systolic Blood Pressure: 118 mmHg      Is BP treated: No      HDL Cholesterol: 51 mg/dL      Total Cholesterol: 177 mg/dL        7/21/2025   Contraception/Family Planning   Questions about contraception or family planning No   What are your periods like? Regular        Reviewed and updated as needed this visit by Provider                    Lab work is in process  BP Readings from Last 3 Encounters:   07/22/25 118/82   07/17/24 120/80   01/11/21 145/83    Wt Readings from Last 3 Encounters:   07/22/25 89.9 kg (198 lb 1.6 oz)   07/17/24 89.7 kg (197 lb 12.8 oz)   08/23/19 83.9 kg (185 lb)                  Patient Active Problem List   Diagnosis    Hyperlipidemia    Menorrhagia    Skin lesion of back     No past surgical history on file.    Social History     Tobacco Use    Smoking status: Never    Smokeless tobacco: Never   Substance Use Topics    Alcohol use: Not Currently     Comment: Very rare 2x year     Family History   Problem Relation Age of Onset    Depression Mother     Depression Father     Breast Cancer Maternal Grandmother     Hypertension Paternal Grandfather     Cerebrovascular Disease Paternal Grandfather     Depression Brother          Current Outpatient Medications   Medication Sig Dispense Refill    atorvastatin (LIPITOR) 20 MG tablet Take 1 tablet (20 mg) by mouth daily 90 tablet 3    Multiple Vitamins-Minerals (ONE-A-DAY WOMENS PO)       ondansetron (ZOFRAN) 4 MG tablet Take 1 tablet (4 mg) by mouth every 6 hours as needed for nausea. 10 tablet 2    valACYclovir (VALTREX) 1000 mg tablet Take 2 tablets (2,000 mg) by mouth 2 times daily. 12 tablet 3     No Known Allergies      Review of Systems  CONSTITUTIONAL: NEGATIVE for fever, chills, change in weight  INTEGUMENTARY/SKIN:  "NEGATIVE for worrisome rashes, moles or lesions  EYES: NEGATIVE for vision changes or irritation  ENT/MOUTH: NEGATIVE for ear, mouth and throat problems  RESP: NEGATIVE for significant cough or SOB  CV: NEGATIVE for chest pain, palpitations or peripheral edema  GI: NEGATIVE for nausea, abdominal pain, heartburn, or change in bowel habits  : normal menstrual cycles  MUSCULOSKELETAL: NEGATIVE for significant arthralgias or myalgia  NEURO: NEGATIVE for weakness, dizziness or paresthesias  ENDOCRINE: NEGATIVE for temperature intolerance, skin/hair changes  PSYCHIATRIC: NEGATIVE for changes in mood or affect     Objective    Exam  /82 (BP Location: Left arm, Patient Position: Sitting, Cuff Size: Adult Regular)   Pulse 72   Temp 98  F (36.7  C) (Tympanic)   Resp 16   Ht 1.692 m (5' 6.6\")   Wt 89.9 kg (198 lb 1.6 oz)   LMP 06/26/2025   SpO2 97%   BMI 31.40 kg/m     Estimated body mass index is 31.4 kg/m  as calculated from the following:    Height as of this encounter: 1.692 m (5' 6.6\").    Weight as of this encounter: 89.9 kg (198 lb 1.6 oz).    Physical Exam  GENERAL: alert and no distress  EYES: Eyes grossly normal to inspection, PERRL and conjunctivae and sclerae normal  HENT: ear canals and TM's normal, nose and mouth without ulcers or lesions  NECK: no adenopathy, no asymmetry, masses, or scars  RESP: lungs clear to auscultation - no rales, rhonchi or wheezes  CV: regular rate and rhythm, normal S1 S2, no S3 or S4, no murmur, click or rub, no peripheral edema  ABDOMEN: soft, nontender, no hepatosplenomegaly, no masses and bowel sounds normal   (female): normal female external genitalia, normal urethral meatus, normal vaginal mucosa  MS: no gross musculoskeletal defects noted, no edema  SKIN: no suspicious lesions or rashes  NEURO: Normal strength and tone, mentation intact and speech normal  PSYCH: mentation appears normal, affect normal/bright  LYMPH: no cervical, supraclavicular, axillary, or " inguinal adenopathy        Signed Electronically by: Antonella Peck, JAMEEL CNP

## 2025-07-22 ENCOUNTER — LAB (OUTPATIENT)
Dept: LAB | Facility: OTHER | Age: 45
End: 2025-07-22
Payer: COMMERCIAL

## 2025-07-22 ENCOUNTER — OFFICE VISIT (OUTPATIENT)
Dept: FAMILY MEDICINE | Facility: OTHER | Age: 45
End: 2025-07-22
Attending: NURSE PRACTITIONER
Payer: COMMERCIAL

## 2025-07-22 VITALS
RESPIRATION RATE: 16 BRPM | TEMPERATURE: 98 F | DIASTOLIC BLOOD PRESSURE: 82 MMHG | HEIGHT: 67 IN | SYSTOLIC BLOOD PRESSURE: 118 MMHG | OXYGEN SATURATION: 97 % | BODY MASS INDEX: 31.09 KG/M2 | WEIGHT: 198.1 LBS | HEART RATE: 72 BPM

## 2025-07-22 DIAGNOSIS — Z86.19 HISTORY OF COLD SORES: ICD-10-CM

## 2025-07-22 DIAGNOSIS — Z11.51 SPECIAL SCREENING EXAMINATION FOR HUMAN PAPILLOMAVIRUS (HPV): ICD-10-CM

## 2025-07-22 DIAGNOSIS — Z00.00 ROUTINE GENERAL MEDICAL EXAMINATION AT A HEALTH CARE FACILITY: ICD-10-CM

## 2025-07-22 DIAGNOSIS — Z00.00 ROUTINE GENERAL MEDICAL EXAMINATION AT A HEALTH CARE FACILITY: Primary | ICD-10-CM

## 2025-07-22 DIAGNOSIS — Z12.31 ENCOUNTER FOR SCREENING MAMMOGRAM FOR BREAST CANCER: ICD-10-CM

## 2025-07-22 DIAGNOSIS — G43.829 MENSTRUAL MIGRAINE WITHOUT STATUS MIGRAINOSUS, NOT INTRACTABLE: ICD-10-CM

## 2025-07-22 DIAGNOSIS — E78.2 MIXED HYPERLIPIDEMIA: ICD-10-CM

## 2025-07-22 LAB
ALBUMIN SERPL BCG-MCNC: 4.3 G/DL (ref 3.5–5.2)
ALP SERPL-CCNC: 62 U/L (ref 40–150)
ALT SERPL W P-5'-P-CCNC: 18 U/L (ref 0–50)
ANION GAP SERPL CALCULATED.3IONS-SCNC: 12 MMOL/L (ref 7–15)
AST SERPL W P-5'-P-CCNC: 20 U/L (ref 0–45)
BASOPHILS # BLD AUTO: 0.1 10E3/UL (ref 0–0.2)
BASOPHILS NFR BLD AUTO: 1 %
BILIRUB SERPL-MCNC: 0.5 MG/DL
BUN SERPL-MCNC: 16.1 MG/DL (ref 6–20)
CALCIUM SERPL-MCNC: 8.8 MG/DL (ref 8.8–10.4)
CHLORIDE SERPL-SCNC: 105 MMOL/L (ref 98–107)
CHOLEST SERPL-MCNC: 170 MG/DL
CREAT SERPL-MCNC: 0.77 MG/DL (ref 0.51–0.95)
EGFRCR SERPLBLD CKD-EPI 2021: >90 ML/MIN/1.73M2
EOSINOPHIL # BLD AUTO: 0.9 10E3/UL (ref 0–0.7)
EOSINOPHIL NFR BLD AUTO: 14 %
ERYTHROCYTE [DISTWIDTH] IN BLOOD BY AUTOMATED COUNT: 13.2 % (ref 10–15)
FASTING STATUS PATIENT QL REPORTED: YES
FASTING STATUS PATIENT QL REPORTED: YES
GLUCOSE SERPL-MCNC: 95 MG/DL (ref 70–99)
HCO3 SERPL-SCNC: 22 MMOL/L (ref 22–29)
HCT VFR BLD AUTO: 39.8 % (ref 35–47)
HDLC SERPL-MCNC: 47 MG/DL
HGB BLD-MCNC: 13.4 G/DL (ref 11.7–15.7)
IMM GRANULOCYTES # BLD: 0 10E3/UL
IMM GRANULOCYTES NFR BLD: 0 %
LDLC SERPL CALC-MCNC: 114 MG/DL
LYMPHOCYTES # BLD AUTO: 1.9 10E3/UL (ref 0.8–5.3)
LYMPHOCYTES NFR BLD AUTO: 29 %
MCH RBC QN AUTO: 29.5 PG (ref 26.5–33)
MCHC RBC AUTO-ENTMCNC: 33.7 G/DL (ref 31.5–36.5)
MCV RBC AUTO: 88 FL (ref 78–100)
MONOCYTES # BLD AUTO: 0.5 10E3/UL (ref 0–1.3)
MONOCYTES NFR BLD AUTO: 7 %
NEUTROPHILS # BLD AUTO: 3.3 10E3/UL (ref 1.6–8.3)
NEUTROPHILS NFR BLD AUTO: 49 %
NONHDLC SERPL-MCNC: 123 MG/DL
NRBC # BLD AUTO: 0 10E3/UL
NRBC BLD AUTO-RTO: 0 /100
PLATELET # BLD AUTO: 347 10E3/UL (ref 150–450)
POTASSIUM SERPL-SCNC: 4.1 MMOL/L (ref 3.4–5.3)
PROT SERPL-MCNC: 7.2 G/DL (ref 6.4–8.3)
RBC # BLD AUTO: 4.55 10E6/UL (ref 3.8–5.2)
SODIUM SERPL-SCNC: 139 MMOL/L (ref 135–145)
TRIGL SERPL-MCNC: 47 MG/DL
WBC # BLD AUTO: 6.7 10E3/UL (ref 4–11)

## 2025-07-22 PROCEDURE — 85025 COMPLETE CBC W/AUTO DIFF WBC: CPT

## 2025-07-22 PROCEDURE — 80053 COMPREHEN METABOLIC PANEL: CPT

## 2025-07-22 PROCEDURE — 36415 COLL VENOUS BLD VENIPUNCTURE: CPT

## 2025-07-22 PROCEDURE — 3049F LDL-C 100-129 MG/DL: CPT

## 2025-07-22 PROCEDURE — 80061 LIPID PANEL: CPT

## 2025-07-22 RX ORDER — VALACYCLOVIR HYDROCHLORIDE 1 G/1
2000 TABLET, FILM COATED ORAL 2 TIMES DAILY
Qty: 12 TABLET | Refills: 3 | Status: SHIPPED | OUTPATIENT
Start: 2025-07-22

## 2025-07-22 RX ORDER — ONDANSETRON 4 MG/1
4 TABLET, FILM COATED ORAL EVERY 6 HOURS PRN
Qty: 10 TABLET | Refills: 2 | Status: SHIPPED | OUTPATIENT
Start: 2025-07-22

## 2025-07-22 RX ORDER — ATORVASTATIN CALCIUM 20 MG/1
20 TABLET, FILM COATED ORAL DAILY
Qty: 90 TABLET | Refills: 3 | Status: SHIPPED | OUTPATIENT
Start: 2025-07-22

## 2025-07-22 ASSESSMENT — PAIN SCALES - GENERAL: PAINLEVEL_OUTOF10: NO PAIN (0)

## 2025-07-22 NOTE — PATIENT INSTRUCTIONS
Patient Education   Preventive Care Advice   This is general advice given by our system to help you stay healthy. However, your care team may have specific advice just for you. Please talk to your care team about your preventive care needs.  Nutrition  Eat 5 or more servings of fruits and vegetables each day.  Try wheat bread, brown rice and whole grain pasta (instead of white bread, rice, and pasta).  Get enough calcium and vitamin D. Check the label on foods and aim for 100% of the RDA (recommended daily allowance).  Lifestyle  Exercise at least 150 minutes each week  (30 minutes a day, 5 days a week).  Do muscle strengthening activities 2 days a week. These help control your weight and prevent disease.  No smoking.  Wear sunscreen to prevent skin cancer.  Have a dental exam and cleaning every 6 months.  Yearly exams  See your health care team every year to talk about:  Any changes in your health.  Any medicines your care team has prescribed.  Preventive care, family planning, and ways to prevent chronic diseases.  Shots (vaccines)   HPV shots (up to age 26), if you've never had them before.  Hepatitis B shots (up to age 59), if you've never had them before.  COVID-19 shot: Get this shot when it's due.  Flu shot: Get a flu shot every year.  Tetanus shot: Get a tetanus shot every 10 years.  Pneumococcal, hepatitis A, and RSV shots: Ask your care team if you need these based on your risk.  Shingles shot (for age 50 and up)  General health tests  Diabetes screening:  Starting at age 35, Get screened for diabetes at least every 3 years.  If you are younger than age 35, ask your care team if you should be screened for diabetes.  Cholesterol test: At age 39, start having a cholesterol test every 5 years, or more often if advised.  Bone density scan (DEXA): At age 50, ask your care team if you should have this scan for osteoporosis (brittle bones).  Hepatitis C: Get tested at least once in your life.  STIs (sexually  transmitted infections)  Before age 24: Ask your care team if you should be screened for STIs.  After age 24: Get screened for STIs if you're at risk. You are at risk for STIs (including HIV) if:  You are sexually active with more than one person.  You don't use condoms every time.  You or a partner was diagnosed with a sexually transmitted infection.  If you are at risk for HIV, ask about PrEP medicine to prevent HIV.  Get tested for HIV at least once in your life, whether you are at risk for HIV or not.  Cancer screening tests  Cervical cancer screening: If you have a cervix, begin getting regular cervical cancer screening tests starting at age 21.  Breast cancer scan (mammogram): If you've ever had breasts, begin having regular mammograms starting at age 40. This is a scan to check for breast cancer.  Colon cancer screening: It is important to start screening for colon cancer at age 45.  Have a colonoscopy test every 10 years (or more often if you're at risk) Or, ask your provider about stool tests like a FIT test every year or Cologuard test every 3 years.  To learn more about your testing options, visit:   .  For help making a decision, visit:   https://bit.ly/se91132.  Prostate cancer screening test: If you have a prostate, ask your care team if a prostate cancer screening test (PSA) at age 55 is right for you.  Lung cancer screening: If you are a current or former smoker ages 50 to 80, ask your care team if ongoing lung cancer screenings are right for you.  For informational purposes only. Not to replace the advice of your health care provider. Copyright   2023 [facility] [employer]. All rights reserved. Clinically reviewed by the [facility]. [employer] [id number] - REV 01/24.       Statin induced pain can try using CoQ10   For cramping in feet try low dose OTC magnesium

## 2025-07-24 LAB
HPV HR 12 DNA CVX QL NAA+PROBE: NEGATIVE
HPV16 DNA CVX QL NAA+PROBE: NEGATIVE
HPV18 DNA CVX QL NAA+PROBE: NEGATIVE
HUMAN PAPILLOMA VIRUS FINAL DIAGNOSIS: NORMAL

## 2025-07-28 LAB
BKR AP ASSOCIATED HPV REPORT: NORMAL
BKR LAB AP GYN ADEQUACY: NORMAL
BKR LAB AP GYN INTERPRETATION: NORMAL
BKR LAB AP LMP: NORMAL
BKR LAB AP PREVIOUS ABNORMAL: NORMAL
PATH REPORT.COMMENTS IMP SPEC: NORMAL
PATH REPORT.COMMENTS IMP SPEC: NORMAL
PATH REPORT.RELEVANT HX SPEC: NORMAL

## 2025-07-29 ENCOUNTER — ANCILLARY PROCEDURE (OUTPATIENT)
Dept: MAMMOGRAPHY | Facility: OTHER | Age: 45
End: 2025-07-29
Attending: NURSE PRACTITIONER
Payer: COMMERCIAL

## 2025-07-29 DIAGNOSIS — Z12.31 ENCOUNTER FOR SCREENING MAMMOGRAM FOR BREAST CANCER: ICD-10-CM

## 2025-07-29 PROCEDURE — 77067 SCR MAMMO BI INCL CAD: CPT | Performed by: RADIOLOGY

## 2025-07-29 PROCEDURE — 77063 BREAST TOMOSYNTHESIS BI: CPT | Performed by: RADIOLOGY
